# Patient Record
(demographics unavailable — no encounter records)

---

## 2025-07-29 NOTE — DISCUSSION/SUMMARY
[FreeTextEntry1] : Unremarkable CBE and pelvic exam SBE reviewed Pap and HPV collected Mammogram ordered Healthy diet, exercise, sleep hygiene discussed Dentist, PCP, Derm as discussed I reviewed dietary, vitamin and exercise measures for maintaining optimal bone density and patient verbalizes understanding of these recommendations. We discussed HPV vaccine and patient provided with brochure. Will check insurance coverage and return for this series if covered. RTO x 1 year or prn

## 2025-07-29 NOTE — COUNSELING
[Nutrition/ Exercise/ Weight Management] : nutrition, exercise, weight management [Breast Self Exam] : breast self exam [HPV Vaccine] : HPV Vaccine

## 2025-07-29 NOTE — PHYSICAL EXAM
[Appropriately responsive] : appropriately responsive [Alert] : alert [No Acute Distress] : no acute distress [No Lymphadenopathy] : no lymphadenopathy [Regular Rate Rhythm] : regular rate rhythm [No Murmurs] : no murmurs [Clear to Auscultation B/L] : clear to auscultation bilaterally [Soft] : soft [Non-tender] : non-tender [Non-distended] : non-distended [No HSM] : No HSM [No Lesions] : no lesions [No Mass] : no mass [Oriented x3] : oriented x3 [FreeTextEntry6] : extranumerary nipples left axilla [Examination Of The Breasts] : a normal appearance [No Masses] : no breast masses were palpable [Labia Majora] : normal [Labia Minora] : normal [Normal] : normal [Uterine Adnexae] : normal

## 2025-07-29 NOTE — HISTORY OF PRESENT ILLNESS
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  Sexually active. S/p BTL.   No hx abnormal bleeding, fibroids, cysts. Neg pap with +HPV last year No urinary complaints.  Past medical, surgical, social and family hx reviewed.  [PapSmeardate] : 2024 [TextBox_31] : neg pap with +HPV per patient [LMPDate] : 7/18/25 [PGHxTotal] : 2 [[facility]

## 2025-07-29 NOTE — HISTORY OF PRESENT ILLNESS
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  Sexually active. S/p BTL.   No hx abnormal bleeding, fibroids, cysts. Neg pap with +HPV last year No urinary complaints.  Past medical, surgical, social and family hx reviewed.  [PapSmeardate] : 2024 [TextBox_31] : neg pap with +HPV per patient [LMPDate] : 7/18/25 [PGHxTotal] : 2 [[facility] [[facility]